# Patient Record
Sex: MALE | HISPANIC OR LATINO | Employment: PART TIME | URBAN - METROPOLITAN AREA
[De-identification: names, ages, dates, MRNs, and addresses within clinical notes are randomized per-mention and may not be internally consistent; named-entity substitution may affect disease eponyms.]

---

## 2019-08-01 ENCOUNTER — OFFICE VISIT (OUTPATIENT)
Dept: URGENT CARE | Facility: PHYSICIAN GROUP | Age: 24
End: 2019-08-01
Payer: COMMERCIAL

## 2019-08-01 VITALS
HEART RATE: 73 BPM | TEMPERATURE: 98.2 F | DIASTOLIC BLOOD PRESSURE: 90 MMHG | RESPIRATION RATE: 16 BRPM | SYSTOLIC BLOOD PRESSURE: 128 MMHG | WEIGHT: 248.2 LBS | OXYGEN SATURATION: 97 %

## 2019-08-01 DIAGNOSIS — K52.9 GASTROENTERITIS: ICD-10-CM

## 2019-08-01 PROCEDURE — 99203 OFFICE O/P NEW LOW 30 MIN: CPT | Performed by: PHYSICIAN ASSISTANT

## 2019-08-01 ASSESSMENT — ENCOUNTER SYMPTOMS
FEVER: 0
NAUSEA: 1
ABDOMINAL PAIN: 1
BLOOD IN STOOL: 0
VOMITING: 1
DIARRHEA: 1
CONSTIPATION: 0
HEARTBURN: 1
CHILLS: 0

## 2019-08-01 NOTE — PROGRESS NOTES
Subjective:      Baron Meyer is a 24 y.o. male who presents with Diarrhea (vomiting x 3 days, heart burn)    PMH: Reviewed with patient/family member/EPIC.   MEDS: No current outpatient medications on file.  ALLERGIES: No Known Allergies  SURGHX: History reviewed. No pertinent surgical history.  SOCHX:  reports that he has never smoked. He has never used smokeless tobacco.  FH: Reviewed with patient, not pertinent to this visit.           Patient presents with:  Diarrhea: vomiting x 3 days,thinks he ate something bad at work.  Needs a work note.  PT also has co of  heart burn for the last few months, he has been drinking a lot of coffee and energy drinks, has a 2 month old baby so he has been drinking more than usual.   Would like to know what OTC medications are safe to take for heartburn.          Gastroenteritis    This is a new problem. The current episode started in the past 7 days. The problem occurs 2 to 4 times per day. The problem has been gradually improving. The emesis has an appearance of stomach contents. There has been no fever. Associated symptoms include abdominal pain and diarrhea. Pertinent negatives include no chest pain, chills or fever. Risk factors include suspect food intake. He has tried increased fluids and diet change for the symptoms. The treatment provided mild relief.       Review of Systems   Constitutional: Negative for chills and fever.   Cardiovascular: Negative for chest pain.   Gastrointestinal: Positive for abdominal pain, diarrhea, heartburn, nausea and vomiting. Negative for blood in stool, constipation and melena.   All other systems reviewed and are negative.         Objective:     /90 (BP Location: Right arm, Patient Position: Sitting, BP Cuff Size: Adult)   Pulse 73   Temp 36.8 °C (98.2 °F) (Temporal)   Resp 16   Wt 112.6 kg (248 lb 3.2 oz)   SpO2 97%      Physical Exam   Constitutional: He is oriented to person, place, and time. He appears well-developed  and well-nourished. No distress.   HENT:   Head: Normocephalic and atraumatic.   Nose: Nose normal.   Mouth/Throat: Oropharynx is clear and moist.   Eyes: Pupils are equal, round, and reactive to light. Conjunctivae and EOM are normal.   Neck: Normal range of motion.   Cardiovascular: Regular rhythm and normal heart sounds.   Pulmonary/Chest: Effort normal and breath sounds normal.   Abdominal: Soft.   Musculoskeletal: Normal range of motion.   Neurological: He is alert and oriented to person, place, and time. Gait normal.   Skin: Skin is warm and dry. Capillary refill takes less than 2 seconds.   Psychiatric: He has a normal mood and affect.   Nursing note and vitals reviewed.              Assessment/Plan:      1. Gastroenteritis       PT to follow clear diet for 8-12 hours, then progress to bland diet for 24 hours, then progress to regular diet as tolerated.     OTC Zantac or Pepcid x 2 weeks for symptoms.     PT should follow up with PCP in 1-2 days for re-evaluation if symptoms have not improved.  Discussed red flags and reasons to return to UC or ED.  Pt and/or family verbalized understanding of diagnosis and follow up instructions and was offered informational handout on diagnosis.  PT discharged.

## 2019-08-01 NOTE — LETTER
August 1, 2019         Patient: Baron Meyer   YOB: 1995   Date of Visit: 8/1/2019           To Whom it May Concern:    Baron Meyer was seen in my clinic on 8/1/2019 for an illness that began 7/28/2019.  Please excuse his absences from work on 7/29/2019- 8/1/2019. He may return to work on 8/2/2019.      If you have any questions or concerns, please don't hesitate to call.        Sincerely,           Gladys Ulloa P.A.-C.  Electronically Signed

## 2019-09-06 ENCOUNTER — OFFICE VISIT (OUTPATIENT)
Dept: URGENT CARE | Facility: PHYSICIAN GROUP | Age: 24
End: 2019-09-06
Payer: COMMERCIAL

## 2019-09-06 VITALS
HEIGHT: 69 IN | OXYGEN SATURATION: 97 % | WEIGHT: 247 LBS | BODY MASS INDEX: 36.58 KG/M2 | SYSTOLIC BLOOD PRESSURE: 128 MMHG | HEART RATE: 74 BPM | DIASTOLIC BLOOD PRESSURE: 84 MMHG | TEMPERATURE: 98.2 F | RESPIRATION RATE: 12 BRPM

## 2019-09-06 DIAGNOSIS — S39.012A STRAIN OF LUMBAR REGION, INITIAL ENCOUNTER: ICD-10-CM

## 2019-09-06 PROCEDURE — 99213 OFFICE O/P EST LOW 20 MIN: CPT | Performed by: NURSE PRACTITIONER

## 2019-09-06 RX ORDER — IBUPROFEN 800 MG/1
800 TABLET ORAL EVERY 8 HOURS PRN
COMMUNITY
End: 2023-10-30

## 2019-09-06 ASSESSMENT — ENCOUNTER SYMPTOMS
NEUROLOGICAL NEGATIVE: 1
CONSTITUTIONAL NEGATIVE: 1
SENSORY CHANGE: 0
GASTROINTESTINAL NEGATIVE: 1
TINGLING: 0
RESPIRATORY NEGATIVE: 1
FOCAL WEAKNESS: 0
BACK PAIN: 1
PERIANAL NUMBNESS: 0
BOWEL INCONTINENCE: 0

## 2019-09-06 NOTE — LETTER
September 6, 2019         Patient: Baron Meyer   YOB: 1995   Date of Visit: 9/6/2019           To Whom it May Concern:    Baron Meyer was seen in my clinic on 9/6/2019. He has missed work since 9/4/2019 due to illness. He may return to work 9/9/2019.    If you have any questions or concerns, please don't hesitate to call.        Sincerely,           MAURA Mendoza.  Electronically Signed

## 2019-09-06 NOTE — PROGRESS NOTES
"Subjective:     Baron Meyer is a 24 y.o. male who presents for Back Pain (lower back pain, needs note for work )       Back Pain   This is a new problem. The problem has been gradually worsening since onset. Pertinent negatives include no bladder incontinence, bowel incontinence, perianal numbness or tingling.     Patient reports that 2 days ago he started to experience bilateral lower back pain.  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  He states he has missed work since Wednesday due to the back pain.  Prior therapy: Ibuprofen which has helped.  Patient is requesting a note for work.    PMH:  has no past medical history on file.    MEDS:   Current Outpatient Medications:   •  ibuprofen (MOTRIN) 800 MG Tab, Take 800 mg by mouth every 8 hours as needed., Disp: , Rfl:     ALLERGIES: No Known Allergies    SURGHX: History reviewed. No pertinent surgical history.    SOCHX:  reports that he has never smoked. He has never used smokeless tobacco.     FH: Reviewed with patient, not pertinent to this visit.    Review of Systems   Constitutional: Negative.    Respiratory: Negative.    Gastrointestinal: Negative.  Negative for bowel incontinence.   Genitourinary: Negative.  Negative for bladder incontinence.   Musculoskeletal: Positive for back pain.   Neurological: Negative.  Negative for tingling, sensory change and focal weakness.   All other systems reviewed and are negative.    Objective:     /84 (BP Location: Left arm, Patient Position: Sitting, BP Cuff Size: Large adult)   Pulse 74   Temp 36.8 °C (98.2 °F) (Temporal)   Resp 12   Ht 1.753 m (5' 9\")   Wt 112 kg (247 lb)   SpO2 97%   BMI 36.48 kg/m²     Physical Exam   Constitutional: He is oriented to person, place, and time. He appears well-developed and well-nourished. He is cooperative.  Non-toxic appearance. No distress.   HENT:   Head: Normocephalic.   Right Ear: External ear normal.   Left Ear: External ear normal.   Nose: Nose " normal.   Eyes: Conjunctivae and EOM are normal.   Neck: Normal range of motion.   Cardiovascular: Normal rate and intact distal pulses.   Pulmonary/Chest: Effort normal. No respiratory distress.   Musculoskeletal: Normal range of motion. He exhibits no deformity.        Lumbar back: He exhibits pain. He exhibits normal range of motion, no tenderness, no swelling, no deformity and no laceration.   Neurological: He is alert and oriented to person, place, and time. He has normal strength. No sensory deficit. Coordination normal.   Skin: Skin is warm and dry. He is not diaphoretic. No pallor.   Psychiatric: He has a normal mood and affect. His behavior is normal.   Vitals reviewed.       Assessment/Plan:     1. Strain of lumbar region, initial encounter    Work note provided.    Advised gentle massage, stretching, and range of motion exercises. May utilize heat application, 20 minutes 3-4x daily. May use OTC acetaminophen/ibuprofen as needed for additional pain relief.    Patient advised close monitoring and to: Return for 1) Symptoms don't improve or worsen, or go to ER, 2) Follow up with primary care in 7-10 days.    Differential diagnosis, natural history, supportive care, and indications for immediate follow-up discussed. All questions answered. Patient agrees with the plan of care.

## 2019-11-15 ENCOUNTER — OFFICE VISIT (OUTPATIENT)
Dept: URGENT CARE | Facility: PHYSICIAN GROUP | Age: 24
End: 2019-11-15
Payer: COMMERCIAL

## 2019-11-15 VITALS
RESPIRATION RATE: 12 BRPM | BODY MASS INDEX: 38.19 KG/M2 | OXYGEN SATURATION: 95 % | TEMPERATURE: 98.3 F | SYSTOLIC BLOOD PRESSURE: 118 MMHG | HEART RATE: 80 BPM | HEIGHT: 68 IN | DIASTOLIC BLOOD PRESSURE: 82 MMHG | WEIGHT: 252 LBS

## 2019-11-15 DIAGNOSIS — A08.4 VIRAL GASTROENTERITIS: ICD-10-CM

## 2019-11-15 PROCEDURE — 99213 OFFICE O/P EST LOW 20 MIN: CPT | Performed by: FAMILY MEDICINE

## 2019-11-15 ASSESSMENT — ENCOUNTER SYMPTOMS
FEVER: 0
COUGH: 0

## 2019-11-15 NOTE — LETTER
November 15, 2019      To Whom It May Concern:           This is confirmation that Baron Meyer attended his scheduled appointment with Alirio Ramirez M.D. on 11/15/19.  Please excuse him from work from 11/11/19 - today as he has been ill.  He is doing much better and is ready to return to work.  He may return to work tomorrow.             If you have any questions please do not hesitate to call me at the phone number listed below.      Sincerely,          Alirio Ramirez M.D.  [phone]

## 2019-11-15 NOTE — PROGRESS NOTES
"Subjective:     Baron Meyer is a 24 y.o. male who presents for Letter for School/Work (Pt was out sick from work and needs a note stating he is ok to return )    HPI  Pt presents for evaluation of a new problem   Pt with diarrhea, nausea, and decreased appetite for the past 4 days   Feeling quite a bit better, but needs to be evaluated to make sure he is safe to return to work   No abd pain today  Diarrhea is nearly resolved   No blood in stool   No vomiting currently     Review of Systems   Constitutional: Negative for fever.   Respiratory: Negative for cough.    Cardiovascular: Negative for chest pain.   Skin: Negative for rash.     PMH: No chronic medical problems   MEDS:   Current Outpatient Medications:   •  ibuprofen (MOTRIN) 800 MG Tab, Take 800 mg by mouth every 8 hours as needed., Disp: , Rfl:   ALLERGIES: No Known Allergies  SURGHX: None  SOCHX:  reports that he has never smoked. He has never used smokeless tobacco. He reports current alcohol use. He reports previous drug use.  FH: Family history was reviewed, not contributing to acute illness     Objective:   /82 (BP Location: Left arm, Patient Position: Sitting, BP Cuff Size: Adult)   Pulse 80   Temp 36.8 °C (98.3 °F) (Temporal)   Resp 12   Ht 1.727 m (5' 8\")   Wt 114.3 kg (252 lb)   SpO2 95%   BMI 38.32 kg/m²     Physical Exam  Constitutional:       General: He is not in acute distress.     Appearance: He is well-developed. He is not diaphoretic.   HENT:      Head: Normocephalic and atraumatic.   Abdominal:      General: Abdomen is flat. Bowel sounds are normal. There is no distension.      Palpations: Abdomen is soft.      Tenderness: There is no tenderness. There is no guarding or rebound.   Skin:     General: Skin is warm and dry.      Findings: No rash.   Neurological:      Mental Status: He is alert and oriented to person, place, and time.   Psychiatric:         Behavior: Behavior normal.         Thought Content: Thought " content normal.         Judgment: Judgment normal.       Assessment/Plan:   Assessment    1. Viral gastroenteritis  Pt recovering, may return to work.  Reviewed F/U precautions and will F/U as needed.

## 2021-03-31 ENCOUNTER — OFFICE VISIT (OUTPATIENT)
Dept: URGENT CARE | Facility: PHYSICIAN GROUP | Age: 26
End: 2021-03-31
Payer: COMMERCIAL

## 2021-03-31 VITALS
HEART RATE: 99 BPM | BODY MASS INDEX: 37.89 KG/M2 | RESPIRATION RATE: 16 BRPM | DIASTOLIC BLOOD PRESSURE: 80 MMHG | SYSTOLIC BLOOD PRESSURE: 130 MMHG | TEMPERATURE: 97.8 F | WEIGHT: 250 LBS | OXYGEN SATURATION: 98 % | HEIGHT: 68 IN

## 2021-03-31 DIAGNOSIS — M54.41 ACUTE RIGHT-SIDED LOW BACK PAIN WITH RIGHT-SIDED SCIATICA: ICD-10-CM

## 2021-03-31 PROCEDURE — 99213 OFFICE O/P EST LOW 20 MIN: CPT | Performed by: PHYSICIAN ASSISTANT

## 2021-03-31 RX ORDER — METHYLPREDNISOLONE 4 MG/1
TABLET ORAL
Qty: 21 TABLET | Refills: 0 | Status: SHIPPED | OUTPATIENT
Start: 2021-03-31 | End: 2023-10-30

## 2021-03-31 RX ORDER — IBUPROFEN 600 MG/1
600 TABLET ORAL EVERY 6 HOURS PRN
Qty: 50 TABLET | Refills: 0 | Status: SHIPPED | OUTPATIENT
Start: 2021-03-31 | End: 2023-10-30

## 2021-03-31 RX ORDER — CYCLOBENZAPRINE HCL 10 MG
10 TABLET ORAL 3 TIMES DAILY PRN
Qty: 20 TABLET | Refills: 0 | Status: SHIPPED | OUTPATIENT
Start: 2021-03-31 | End: 2023-10-30

## 2021-03-31 ASSESSMENT — ENCOUNTER SYMPTOMS
HEADACHES: 0
WEAKNESS: 0
MYALGIAS: 1
TINGLING: 0
FEVER: 0
DIZZINESS: 0
BACK PAIN: 1
CHILLS: 0
FALLS: 0

## 2021-03-31 NOTE — PROGRESS NOTES
"Subjective:   Baron Meyer is a 25 y.o. male who presents for Low Back Pain (x3days )      HPI:  This is a very pleasant 25-year-old male presenting to the clinic with acute onset right-sided low back pain x3 days.  Patient states he was lifting a heavy box while helping a friend move.  He felt an immediate sharp twinge to his right-sided lumbar area.  Pain is described as a constant dull ache.  He occasionally has right-sided radicular pains with certain movements.  Sitting for extended periods of time and lumbar flexion seem to aggravate his pain.  He denies any change in bowel or bladder function.  Denies any saddle paresthesias.  Denies any numbness or tingling to the lower extremities.  He sustained no direct trauma.  He has been trying Tylenol as well as icy hot with minimal improvement.  No previous surgery to the lumbar spine.    Review of Systems   Constitutional: Negative for chills, fever and malaise/fatigue.   Genitourinary:        Denies any saddle paresthesias or change in bowel or bladder function.   Musculoskeletal: Positive for back pain and myalgias. Negative for falls.   Neurological: Negative for dizziness, tingling, weakness and headaches.       Medications:    • cyclobenzaprine Tabs  • ibuprofen Tabs  • methylPREDNISolone Tbpk    Allergies: Patient has no known allergies.    Problem List: Baron Meyer does not have a problem list on file.    Surgical History:  No past surgical history on file.    Past Social Hx: Baron Meyer  reports that he has never smoked. He has never used smokeless tobacco. He reports current alcohol use. He reports previous drug use.     Past Family Hx:  Baron Meyer family history is not on file.     Problem list, medications, and allergies reviewed by myself today in Epic.     Objective:     /80   Pulse 99   Temp 36.6 °C (97.8 °F) (Temporal)   Resp 16   Ht 1.727 m (5' 8\")   Wt 113 kg (250 lb)   SpO2 98%   BMI 38.01 kg/m² "     Physical Exam  Constitutional:       General: He is not in acute distress.     Appearance: Normal appearance. He is not ill-appearing, toxic-appearing or diaphoretic.   HENT:      Head: Normocephalic and atraumatic.   Eyes:      Conjunctiva/sclera: Conjunctivae normal.   Cardiovascular:      Rate and Rhythm: Normal rate and regular rhythm.   Musculoskeletal:      Cervical back: Normal range of motion.      Comments: Lumbar exam: No midline tenderness to palpation.  No obvious deformities or step-offs.  Mild right-sided paraspinal muscle tenderness to palpation.  Patient has limited lumbar flexion due to pain.  Lumbar extension and rotation full and pain-free.  Positive SLR to the right leg.  Bilateral lower extremity strength and sensation intact.  Normal gait.   Skin:     Capillary Refill: Capillary refill takes less than 2 seconds.   Neurological:      General: No focal deficit present.      Mental Status: He is alert and oriented to person, place, and time. Mental status is at baseline.           Assessment/Plan:     Diagnosis and associated orders:     1. Acute right-sided low back pain with right-sided sciatica  methylPREDNISolone (MEDROL DOSEPAK) 4 MG Tablet Therapy Pack    ibuprofen (MOTRIN) 600 MG Tab    cyclobenzaprine (FLEXERIL) 10 mg Tab      Comments/MDM:     Take medication as prescribed. Discussed sedative effects of muscle relaxers.  Can take OTC Tylenol as directed for pain.   Temperature Therapy: Heat or ice, whichever feels better.  Gentle ROM back stretches and exercises. Resume activity as tolerated.  Follow up with your primary care doctor.  • Follow up for persistent, new, or worsening pain. Emergently for weakness, loss of bowel or bladder, groin pain or numbness, fevers.           Differential diagnosis, natural history, supportive care, and indications for immediate follow-up discussed.    Advised the patient to follow-up with the primary care physician for recheck, reevaluation, and  consideration of further management.    Please note that this dictation was created using voice recognition software. I have made reasonable attempt to correct obvious errors, but I expect that there are errors of grammar and possibly content that I did not discover before finalizing the note.    This note was electronically signed by SHANKAR Giles PA-C

## 2021-03-31 NOTE — LETTER
[facility] [address] [address]     March 31, 2021    Patient: Baron Meyer   YOB: 1995   Date of Visit: 3/31/2021       To Whom It May Concern:    Baron Meyer was seen and treated in our department on 3/31/2021.  Excuse from work on 3/31/2021.  Patient is beginning treatment for a lumbar strain.  Cleared to return to work on 4/1/2021.  Call with any questions or concerns at [phone].    Sincerely,     Reynaldo Giles P.A.-C.

## 2021-10-26 ENCOUNTER — APPOINTMENT (OUTPATIENT)
Dept: URGENT CARE | Facility: PHYSICIAN GROUP | Age: 26
End: 2021-10-26

## 2021-10-29 ENCOUNTER — OFFICE VISIT (OUTPATIENT)
Dept: URGENT CARE | Facility: PHYSICIAN GROUP | Age: 26
End: 2021-10-29

## 2021-10-29 VITALS
DIASTOLIC BLOOD PRESSURE: 80 MMHG | SYSTOLIC BLOOD PRESSURE: 132 MMHG | RESPIRATION RATE: 16 BRPM | OXYGEN SATURATION: 97 % | HEIGHT: 68 IN | TEMPERATURE: 98.8 F | BODY MASS INDEX: 40.77 KG/M2 | WEIGHT: 269 LBS | HEART RATE: 81 BPM

## 2021-10-29 DIAGNOSIS — Z00.00 HEALTH CARE MAINTENANCE: ICD-10-CM

## 2021-10-29 DIAGNOSIS — Z02.89 ENCOUNTER FOR EXAMINATION REQUIRED BY DEPARTMENT OF TRANSPORTATION (DOT): Primary | ICD-10-CM

## 2021-10-29 DIAGNOSIS — R31.1 BENIGN ESSENTIAL MICROSCOPIC HEMATURIA: ICD-10-CM

## 2021-10-29 PROCEDURE — 7100 PR DOT PHYSICAL: Performed by: NURSE PRACTITIONER

## 2021-10-29 NOTE — PROGRESS NOTES
Subjective:     Baron Meyer is a 26 y.o. 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  Patient states he was here few days ago for evaluation however, was sent away due to poor vision acuity test.  He has since acquired glasses and has near perfect vision.     ROS    PMH: History reviewed. No pertinent past medical history.  ALLERGIES: No Known Allergies  SURGHX: History reviewed. No pertinent surgical history.  SOCHX:   Social History     Socioeconomic History   • Marital status:      Spouse name: Not on file   • Number of children: Not on file   • Years of education: Not on file   • Highest education level: Not on file   Occupational History   • Not on file   Tobacco Use   • Smoking status: Never Smoker   • Smokeless tobacco: Never Used   Vaping Use   • Vaping Use: Never used   Substance and Sexual Activity   • Alcohol use: Yes     Comment: occ   • Drug use: Not Currently   • Sexual activity: Not on file   Other Topics Concern   • Not on file   Social History Narrative   • Not on file     Social Determinants of Health     Financial Resource Strain:    • Difficulty of Paying Living Expenses:    Food Insecurity:    • Worried About Running Out of Food in the Last Year:    • Ran Out of Food in the Last Year:    Transportation Needs:    • Lack of Transportation (Medical):    • Lack of Transportation (Non-Medical):    Physical Activity:    • Days of Exercise per Week:    • Minutes of Exercise per Session:    Stress:    • Feeling of Stress :    Social Connections:    • Frequency of Communication with Friends and Family:    • Frequency of Social Gatherings with Friends and Family:    • Attends [employer] Services:    • Active Member of Clubs or Organizations:    • Attends Club or Organization Meetings:    • Marital Status:    Intimate Partner Violence:    • Fear of Current or Ex-Partner:    • Emotionally Abused:    • Physically Abused:    •  "Sexually Abused:      FH: History reviewed. No pertinent family history.      Objective:   /80   Pulse 81   Temp 37.1 °C (98.8 °F)   Resp 16   Ht 1.727 m (5' 8\")   Wt 122 kg (269 lb)   SpO2 97%   BMI 40.90 kg/m²     Physical Exam  Please see scanned physical assessment form.  Assessment/Plan:   Assessment      1. Encounter for examination required by Department of Transportation (DOT)     2. Health care maintenance  Referral back to [facility] PCP   3. Benign essential microscopic hematuria  Referral back to [facility] PCP   Patient was cleared for 2-year DOT.  Prefer to follow-up with primary care for healthcare maintenance as well as trace hematuria and POCT urine.     "

## 2023-10-30 ENCOUNTER — OFFICE VISIT (OUTPATIENT)
Dept: URGENT CARE | Facility: PHYSICIAN GROUP | Age: 28
End: 2023-10-30

## 2023-10-30 VITALS
OXYGEN SATURATION: 98 % | HEIGHT: 68 IN | WEIGHT: 257 LBS | DIASTOLIC BLOOD PRESSURE: 82 MMHG | HEART RATE: 64 BPM | BODY MASS INDEX: 38.95 KG/M2 | SYSTOLIC BLOOD PRESSURE: 132 MMHG | TEMPERATURE: 97.9 F | RESPIRATION RATE: 18 BRPM

## 2023-10-30 DIAGNOSIS — Z02.4 ENCOUNTER FOR CDL (COMMERCIAL DRIVING LICENSE) EXAM: ICD-10-CM

## 2023-10-30 PROCEDURE — 7100 PR DOT PHYSICAL: Performed by: NURSE PRACTITIONER

## 2023-10-30 PROCEDURE — 3079F DIAST BP 80-89 MM HG: CPT | Performed by: NURSE PRACTITIONER

## 2023-10-30 PROCEDURE — 3075F SYST BP GE 130 - 139MM HG: CPT | Performed by: NURSE PRACTITIONER

## 2023-10-30 NOTE — PROGRESS NOTES
"Subjective     Baron Meyer is a 28 y.o. male who presents with Other (DOT Physcial)            HPI DOT    ROS           Objective     /82   Pulse 64   Temp 36.6 °C (97.9 °F)   Resp 18   Ht 1.727 m (5' 8\")   Wt 117 kg (257 lb)   SpO2 98%   BMI 39.08 kg/m²      Physical Exam                        Assessment & Plan        1. 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                 "